# Patient Record
(demographics unavailable — no encounter records)

---

## 2025-01-27 NOTE — PHYSICAL EXAM
[Normal Appearance] : normal appearance [Well Groomed] : well groomed [General Appearance - In No Acute Distress] : no acute distress [] : no rash [Oriented To Time, Place, And Person] : oriented to person, place, and time [Affect] : the affect was normal [Mood] : the mood was normal [de-identified] : Prior 3.25# ring with support removed and replaced, healthy appearing tissues  [Chaperone Present] : A chaperone was present in the examining room during all aspects of the physical examination [[id number]] : A chaperone was present during the pelvic exam. [FreeTextEntry2] : Ada GRIFFIN

## 2025-01-27 NOTE — ASSESSMENT
[FreeTextEntry1] : 74-year-old female who presents for pelvic organ prolapse  Patient is had no issues with the pessary.  This was removed and replaced without issue.  Discussed vaginal estrogen cream and that there has been no association with breast cancer.  We also discussed the role of coconut oil if she is more comfortable with this.   Return to clinic in 3 months

## 2025-01-27 NOTE — REASON FOR VISIT
[TextEntry] : 74-year-old female who presents for pelvic organ prolapse  Patient's been dealing with prolapse for the past 6 years.  It was mostly nonbothersome until to 3 months ago.  She has the sensation that she sitting on a ball and is splinting for bladder emptying.  She also feels like when she is on her feet for a while, her stream is a dribble.  She is nocturia x 2.  She denies any prior urologic history.  She denies stress urinary incontinence.  She is a history of 2 vaginal deliveries 1 of which was breech.  She had a history of a D&C for hyperplasia 15 years ago as well as fibroid removal 7 years ago for cramping.  She is not sexually active.  She has IBS that is diet controlled She denies diabetes  PVR 49 cc Creatinine 1.6, GFR 33 A1c 5.5% Genitourinary: the meatus of the urethra showed no abnormalities . +vaginal atrophy, TVL 9 C +6 Aa +3 Ap +6 Ba +3 Bp +4 ; fitted with 2.75" ring pessary.    Today on 9/10 patient reports that the pessary dislodge 3 hours after she was seen.  She is interested in another fitting. Genitourinary:. Fitted with 3" pessary ring, dislodged; refitted with 3.25" ring with support.  Today on 9/23 patient reports that the pessary is very comfortable and is work well.  It is stayed in place.  She has no complaints.  She is voiding without difficulty.  Today on 1/27 patient reports that overall the pessary has worked well.  She has had no issues.  She denies any vaginal bleeding or discomfort.  She finds it to be very comfortable.  She has not been using estrogen cream due to concerns about breast cancer risk.  Her mother had breast cancer.

## 2025-02-08 NOTE — REVIEW OF SYSTEMS
[Anxiety] : anxiety [Fever] : no fever [Earache] : no earache [Chest Pain] : no chest pain [Palpitations] : no palpitations [Shortness Of Breath] : no shortness of breath [Abdominal Pain] : no abdominal pain [Joint Pain] : no joint pain

## 2025-02-08 NOTE — HEALTH RISK ASSESSMENT
[Yes] : Yes [Monthly or less (1 pt)] : Monthly or less (1 point) [1 or 2 (0 pts)] : 1 or 2 (0 points) [Never (0 pts)] : Never (0 points) [No] : In the past 12 months have you used drugs other than those required for medical reasons? No [No falls in past year] : Patient reported no falls in the past year [0] : 2) Feeling down, depressed, or hopeless: Not at all (0) [Never] : Never [Audit-CScore] : 1 [de-identified] : minimal [de-identified] : healthy [[id number]] : 0

## 2025-02-08 NOTE — HEALTH RISK ASSESSMENT
[Yes] : Yes [Monthly or less (1 pt)] : Monthly or less (1 point) [1 or 2 (0 pts)] : 1 or 2 (0 points) [Never (0 pts)] : Never (0 points) [No] : In the past 12 months have you used drugs other than those required for medical reasons? No [No falls in past year] : Patient reported no falls in the past year [0] : 2) Feeling down, depressed, or hopeless: Not at all (0) [Never] : Never [Audit-CScore] : 1 [de-identified] : minimal [de-identified] : healthy [[id number]] : 0

## 2025-02-08 NOTE — PHYSICAL EXAM
[No Acute Distress] : no acute distress [No JVD] : no jugular venous distention [Clear to Auscultation] : lungs were clear to auscultation bilaterally [Regular Rhythm] : with a regular rhythm [No Edema] : there was no peripheral edema [Soft] : abdomen soft [Non Tender] : non-tender [Non-distended] : non-distended [No CVA Tenderness] : no CVA  tenderness [Alert and Oriented x3] : oriented to person, place, and time

## 2025-02-08 NOTE — PLAN
[FreeTextEntry1] : blood sent for routine labs no interval change in status continue current management otherwise

## 2025-02-08 NOTE — HISTORY OF PRESENT ILLNESS
[FreeTextEntry1] : for f/u on BP  [de-identified] : notes occasional indigestion but feels otherwise well requesting routine labs

## 2025-02-08 NOTE — HISTORY OF PRESENT ILLNESS
[FreeTextEntry1] : for f/u on BP  [de-identified] : notes occasional indigestion but feels otherwise well requesting routine labs

## 2025-03-27 NOTE — HISTORY OF PRESENT ILLNESS
[FreeTextEntry1] :  notes isolated premature beats  no associated symptoms has been recurrent issue currently wearing recorder which will br read  by cardiology [de-identified] : followed regularly by cardiology for same issue[Dr Peña] no CP or dyspnea

## 2025-03-27 NOTE — HEALTH RISK ASSESSMENT
[No] : In the past 12 months have you used drugs other than those required for medical reasons? No [No falls in past year] : Patient reported no falls in the past year [0] : 2) Feeling down, depressed, or hopeless: Not at all (0) [Never] : Never [Audit-CScore] : 0 [de-identified] : walking [de-identified] : trying [[id number]] : 0

## 2025-03-27 NOTE — HEALTH RISK ASSESSMENT
[No] : In the past 12 months have you used drugs other than those required for medical reasons? No [No falls in past year] : Patient reported no falls in the past year [0] : 2) Feeling down, depressed, or hopeless: Not at all (0) [Never] : Never [Audit-CScore] : 0 [de-identified] : walking [de-identified] : trying [[id number]] : 0

## 2025-03-27 NOTE — HISTORY OF PRESENT ILLNESS
[FreeTextEntry1] :  notes isolated premature beats  no associated symptoms has been recurrent issue currently wearing recorder which will br read  by cardiology [de-identified] : followed regularly by cardiology for same issue[Dr Peña] no CP or dyspnea

## 2025-03-27 NOTE — PLAN
[FreeTextEntry1] : currently wearing recording device to be reviewed by cardiology no acute problem identified blood sent for routine labs

## 2025-03-27 NOTE — HEALTH RISK ASSESSMENT
[No] : In the past 12 months have you used drugs other than those required for medical reasons? No [No falls in past year] : Patient reported no falls in the past year [0] : 2) Feeling down, depressed, or hopeless: Not at all (0) [Never] : Never [Audit-CScore] : 0 [de-identified] : walking [de-identified] : trying [[id number]] : 0

## 2025-04-30 NOTE — ASSESSMENT
[FreeTextEntry1] : 75-year-old female with pelvic organ prolapse   Pessary exchanged today.  Follow-up in 3 or 4 months for next exchange, advised that she would see my partner if she returns in 3 months

## 2025-04-30 NOTE — REASON FOR VISIT
[TextEntry] : 74-year-old female who presents for pelvic organ prolapse  Patient's been dealing with prolapse for the past 6 years.  It was mostly nonbothersome until to 3 months ago.  She has the sensation that she sitting on a ball and is splinting for bladder emptying.  She also feels like when she is on her feet for a while, her stream is a dribble.  She is nocturia x 2.  She denies any prior urologic history.  She denies stress urinary incontinence.  She is a history of 2 vaginal deliveries 1 of which was breech.  She had a history of a D&C for hyperplasia 15 years ago as well as fibroid removal 7 years ago for cramping.  She is not sexually active.  She has IBS that is diet controlled She denies diabetes  PVR 49 cc Creatinine 1.6, GFR 33 A1c 5.5% Genitourinary: the meatus of the urethra showed no abnormalities . +vaginal atrophy, TVL 9 C +6 Aa +3 Ap +6 Ba +3 Bp +4 ; fitted with 2.75" ring pessary.    Today on 9/10 patient reports that the pessary dislodge 3 hours after she was seen.  She is interested in another fitting. Genitourinary:. Fitted with 3" pessary ring, dislodged; refitted with 3.25" ring with support.  Today on 9/23 patient reports that the pessary is very comfortable and is work well.  It is stayed in place.  She has no complaints.  She is voiding without difficulty.  Today on 1/27 patient reports that overall the pessary has worked well.  She has had no issues.  She denies any vaginal bleeding or discomfort.  She finds it to be very comfortable.  She has not been using estrogen cream due to concerns about breast cancer risk.  Her mother had breast cancer. Genitourinary:. Prior 3.25# ring with support removed and replaced, healthy appearing tissues.  Today on 4/30 patient reports no interval issues.  She has been using estrogen cream.  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  She went for ultrasound and this was negative.

## 2025-04-30 NOTE — PHYSICAL EXAM
[Normal Appearance] : normal appearance [Well Groomed] : well groomed [General Appearance - In No Acute Distress] : no acute distress [] : no rash [Oriented To Time, Place, And Person] : oriented to person, place, and time [Affect] : the affect was normal [Mood] : the mood was normal [de-identified] : Prior 3.25# ring with support removed and replaced, healthy appearing tissues  [Chaperoned Physical Exam] : A chaperone was present in the examining room during all aspects of the physical examination. [FreeTextEntry2] : Ada GRIFFIN

## 2025-06-09 NOTE — DISCUSSION/SUMMARY
[Patient] : the patient [FreeTextEntry1] : Dr. Peña-(PRIOR VISIT and PMH WITH Dr. Peña): This is a 73-year-old female clusters of palpitations, hypertension, hyperlipidemia, who comes in for followup cardiac evaluation. She denies chest pain, shortness of breath, dizziness or syncope. The patient had a normal exercise stress test February 12, 2024. Echo Doppler examination done October 10, 2023 demonstrated normal left ventricular ejection fraction of 63% with a range of 60 to 65%, mild mitral and mild tricuspid valve regurgitation and minimal pulmonic valve regurgitation. The patient reports she had recent blood work done by her nephrologist. She will get me a copy of those results for my review. Lipid profile done March 13, 2023 demonstrated cholesterol 163, HDL of 47, triglycerides 110, LDL direct 99, non-HDL cholesterol 160 mg/dL with hemoglobin A1c of 5.6.  Her GFR is 34 cc/min and creatinine 1.60 with a BUN of 32 She is currently hemodynamically stable and asymptomatic from cardiac standpoint.  She will continue on her current dose of Zocor 20 mg, Benicar hydrochlorothiazide 20/12.5 mg/day and metoprolol tartrate 25 mg twice per day  Electrocardiogram done March 3, 2022 demonstrated sinus bradycardia rate of 56 beats per minute is otherwise unremarkable. The blood pressure is under good control. She will followup with her nephrologist. Electrocardiogram done July 13, 2021 demonstrates sinus bradycardia rate of 52 bpm is otherwise remarkable for nonspecific ST wave changes. Echo Doppler examination done July 13, 2021 demonstrated moderate mitral valve regurgitation, mild tricuspid valve regurgitation, mild pulmonic valve regurgitation, mild left atrial enlargement and satisfactory left ventricular function with an estimated ejection fraction of 66%. She had a normal exercise stress test September 11, 2020.   PMH: The patient had a infection the end of February 2020.  She felt this was a viral syndrome but was "quite sick".  She developed a clot in her right lower extremity February 26, 2020.  This was confirmed at [facility] with venous Dopplers demonstrating an intraluminal thrombus in the right popliteal vein and tibial trunk veins.  She was put on Eliquis 5 mg twice per day and followed up by Dr. Hernandez in a vascular surgery.  The patient reports a subsequent venous Doppler was done approximately 3 months later and the clot had disappeared.  She was taken off Eliquis at that time  She had a Holter monitor done April 12, 2019 which demonstrated normal sinus rhythm without significant ectopy or heart block.    The patient understands that aerobic exercises must be increased to 40 minutes 4 times per week. A detailed discussion of lifestyle modification was done today. The patient has a good understanding of the diagnosis, and treatment plan. Lifestyle modification was also outlined.

## 2025-06-09 NOTE — PHYSICAL EXAM
[Well Developed] : well developed [Well Nourished] : well nourished [No Acute Distress] : no acute distress [Normal Venous Pressure] : normal venous pressure [No Carotid Bruit] : no carotid bruit [Normal S1, S2] : normal S1, S2 [No Rub] : no rub [No Pitting Edema] : no pitting edema present [Clear Lung Fields] : clear lung fields [Good Air Entry] : good air entry [No Respiratory Distress] : no respiratory distress  [Soft] : abdomen soft [Non Tender] : non-tender [No Masses/organomegaly] : no masses/organomegaly [Normal Gait] : normal gait [Normal Bowel Sounds] : normal bowel sounds [No Edema] : no edema [No Cyanosis] : no cyanosis [No Clubbing] : no clubbing [No Varicosities] : no varicosities [No Rash] : no rash [No Skin Lesions] : no skin lesions [Moves all extremities] : moves all extremities [Normal Speech] : normal speech [No Focal Deficits] : no focal deficits [Alert and Oriented] : alert and oriented [Normal memory] : normal memory [General Appearance - Well Developed] : well developed [Normal Appearance] : normal appearance [Well Groomed] : well groomed [General Appearance - Well Nourished] : well nourished [No Deformities] : no deformities [General Appearance - In No Acute Distress] : no acute distress [Normal Conjunctiva] : the conjunctiva exhibited no abnormalities [Normal Oral Mucosa] : normal oral mucosa [Normal Oropharynx] : normal oropharynx [Normal Jugular Venous A Waves Present] : normal jugular venous A waves present [Normal Jugular Venous V Waves Present] : normal jugular venous V waves present [No Jugular Venous Spring A Waves] : no jugular venous spring A waves [Respiration, Rhythm And Depth] : normal respiratory rhythm and effort [Exaggerated Use Of Accessory Muscles For Inspiration] : no accessory muscle use [Auscultation Breath Sounds / Voice Sounds] : lungs were clear to auscultation bilaterally [Bowel Sounds] : normal bowel sounds [Abnormal Walk] : normal gait [Abdomen Soft] : soft [Nail Clubbing] : no clubbing of the fingernails [Cyanosis, Localized] : no localized cyanosis [Skin Color & Pigmentation] : normal skin color and pigmentation [] : no rash [Skin Lesions] : no skin lesions [No Venous Stasis] : no venous stasis [No Skin Ulcers] : no skin ulcer [No Xanthoma] : no  xanthoma was observed [Oriented To Time, Place, And Person] : oriented to person, place, and time [Impaired Insight] : insight and judgment were intact [5th Left ICS - MCL] : palpated at the 5th LICS in the midclavicular line [Normal] : normal [No Precordial Heave] : no precordial heave was noted [Normal Rate] : normal [Rhythm Regular] : regular [Normal S1] : normal S1 [Normal S2] : normal S2 [No Gallop] : no gallop heard [I] : a grade 1 [Right Carotid Bruit] : right carotid bruit heard [2+] : right 2+ [___ +] : bilateral [unfilled]U+ pitting edema to the ankles [S3] : no S3 [S4] : no S4 [Click] : no click [Pericardial Rub] : no pericardial rub

## 2025-06-09 NOTE — ASSESSMENT
[FreeTextEntry1] : This is a 74-year-old female here today for follow-up cardiac evaluation.   She has a past medical history significant for palpitations, hypertension, hyperlipidemia and CKD.  HPI: She is feeling generally well today and denies chest pain, dizziness, heart palpitations, recent episodes of syncope or falls, SOB, or dyspnea at this time.  Current Medications: Metoprolol tartrate 25 mg BID, Olmesartan-Hydrochlorothiazide 20-12.5 mg daily and Simvastatin 20 mg daily.   She was seen by Dr. Segovia in May 2024 who recommended continued continuing antiarrhythmic medication as ablation who have a low yield of success with a low burden seen on her Zio.   She follows up routinely with her nephrologist Dr. Sarmiento.   BLOOD PRESSURE: -BP is well controlled in today's visit.    BLOOD WORK: -Lipid panel done February 2025 demonstrated triglycerides 111, cholesterol 165, HDL 52, LDL 93, non-. -Lipid panel done June 2024 demonstrated triglycerides 154, cholesterol 167, HDL 51, LDL 83, non-, LDL direct 89. -Lipid panel done March 2024 demonstrated cholesterol 156, triglycerides 117, HDL 55 LDL 80, non-, LDL direct 80, Potassium 4.0 mmol/L -Lipid profile done March 13, 2023 demonstrated cholesterol 163, HDL of 47, triglycerides 110, LDL direct 99, non-HDL cholesterol 160 mg/dL with hemoglobin A1c of 5.6.  Her GFR is 34 cc/min and creatinine 1.60 with a BUN of 32     TESTING/REPORTS: -EKG done March 2025 demonstrated normal sinus rhythm rate 65 bpm otherwise unremarkable.  -Zio extended Holter monitor done March 2025 demonstrated predominant underlying rhythm: Sinus rhythm with average heart rate 89 bpm.  Episode of SVT, 8 beats with average heart rate of 102 bpm, maximum heart rate 175 bpm.  No significant symptoms, no heart block.  -Echocardiogram done March 2025 demonstrated normal left ventricular systolic function EF 65%, mild to moderate mitral regurgitation, mild tricuspid regurgitation, mild pulmonic regurgitation.  -EKG done 06/25/2024 demonstrated sinus bradycardia rate 49 BPM otherwise unremarkable.  -EKG done 03/22/2024 demonstrated sinus bradycardia rate 51 BPM otherwise unremarkable.   -Zio-Holter monitor done March 2024 demonstrated overall average HR 56 BPM with predominant underlying rhythm of normal sinus. No significant arrythmia or heart block present.  -The patient had a normal exercise stress test February 12, 2024.  -Echo Doppler examination done October 10, 2023 demonstrated normal left ventricular ejection fraction of 63% with a range of 60 to 65%, mild mitral and mild tricuspid valve regurgitation and minimal pulmonic valve regurgitation.  -EKG done 03/13/2023 which demonstrated regular sinus rhythm with nonspecific ST-T wave changes BPM of 53.  -Electrocardiogram done March 3, 2022 demonstrated sinus bradycardia rate of 56 beats per minute is otherwise unremarkable.  -Carotid Doppler done July 29, 2021 demonstrated normal study  -Electrocardiogram done July 13, 2021 demonstrates sinus bradycardia rate of 52 bpm is otherwise remarkable for nonspecific ST wave changes.  -Echo Doppler examination done July 13, 2021 demonstrated moderate mitral valve regurgitation, mild tricuspid valve regurgitation, mild pulmonic valve regurgitation, mild left atrial enlargement and satisfactory left ventricular function with an estimated ejection fraction of 66%.  -EKG done Feb 12, 2021 which demonstrated regular sinus rhythm with nonspecific ST-T wave changes, BPM of 60.  -The patient had an echocardiogram done on 10/23/2019 which demonstrated mild mitral, tricuspid and pulmonic regurgitation with normal left ventricular systolic function. EF on echo reported to be 65%.  -The patient had a normal exercise stress test done on 9/11/2020.  -The patient had a normal 24 hour holter Monitor placed on 2/12/2021 that reveals sinus rhythm with an average HR of 65 BPM.   -She had a Holter monitor done April 12, 2019 which demonstrated normal sinus rhythm without significant ectopy or heart block.    -PMH: The patient had an infection the end of February 2020.  She felt this was a viral syndrome but was "quite sick".  She developed a clot in her right lower extremity February 26, 2020.  This was confirmed at [facility] with venous Dopplers demonstrating an intraluminal thrombus in the right popliteal vein and tibial trunk veins.  She was put on Eliquis 5 mg twice per day and followed up by Dr. Hernandez in a vascular surgery.  The patient reports a subsequent venous Doppler was done approximately 3 months later and the clot had disappeared.  She was taken off Eliquis at that time   PLAN: -She will continue with her current medications and will contact the office if she is having any complaints between now and their next follow up appointment. -She will follow-up with her PCP routinely.   I have discussed the plan of care with DINA WASHINGTON and she will follow up in 4-6 months. They are compliant with all of their medications.     The patient understands that aerobic exercises must be increased to 40 minutes 4 times/week and a detailed discussion of lifestyle modification was done today.   The patient has a good understanding of the diagnosis, treatment plan and lifestyle modification.   They will contact me at the office for any questions with their care or any changes in their health status.   The patient was discussed with supervision physician Dr. Phil Peña at the time of the visit and the plan of care will be carried out as noted above.     ISAÍAS Joel NP

## 2025-06-09 NOTE — REASON FOR VISIT
[CV Risk Factors and Non-Cardiac Disease] : CV risk factors and non-cardiac disease [Follow-Up - Clinic] : a clinic follow-up of [Hyperlipidemia] : hyperlipidemia [Hypertension] : hypertension [Palpitations] : palpitations [FreeTextEntry3] : Dr. Polanco  [FreeTextEntry1] : murmur, renal insufficiency

## 2025-07-30 NOTE — ASSESSMENT
[FreeTextEntry1] : 75  y.o G2 P 2  2-woman former pt of Dr Foss here today for pessary change , last fitted in 2025. She noted brownish staining on her poise pad for the past 4 days , with no other accompanying constitutional ss .Fluids - suboptimal , no thirst  Voids 2-3 times DTF and nocturia x 2 with UUI requiring 1 poise pad per day. She has not seen a gyn in a few years.  PVR 18 ml    Ring Pessary with support # 6 removed slightly brown stains, tolerated, no blood noted in the vaginal vault, and inserted new Ring pessary with support # 6 tolerated, and pt tolerated it well RTO in 3 months with Dr Foss

## 2025-07-30 NOTE — HISTORY OF PRESENT ILLNESS
[FreeTextEntry1] : 75  y.o G2 P 2  2-woman former pt of Dr Foss here today for pessary change , last fitted in 2025. She noted brownish staining on her poise pad for the past 4 days , with no other accompanying constitutional ss .Fluids - suboptimal , no thirst  Voids 2-3 times DTF and nocturia x 2 with UUI requiring 1 poise pad per day. She has not seen a gyn in a few years.  PVR 18 ml   Pessary # 6 removed slightly brown stains, tolerated, no blood noted in the vaginal vault, and inserted new Ring pessary # 6 tolerated, and pt tolerated it well RTO in 3 months with Dr Foss

## 2025-07-30 NOTE — PHYSICAL EXAM
[Normal Appearance] : normal appearance [Well Groomed] : well groomed [General Appearance - In No Acute Distress] : no acute distress [Edema] : no peripheral edema [Respiration, Rhythm And Depth] : normal respiratory rhythm and effort [Exaggerated Use Of Accessory Muscles For Inspiration] : no accessory muscle use [Abdomen Soft] : soft [Abdomen Tenderness] : non-tender [Costovertebral Angle Tenderness] : no ~M costovertebral angle tenderness [Urethral Meatus] : normal urethra [Urinary Bladder Findings] : the bladder was normal on palpation [External Female Genitalia] : normal external genitalia [Normal Station and Gait] : the gait and station were normal for the patient's age [] : no rash [No Focal Deficits] : no focal deficits [Oriented To Time, Place, And Person] : oriented to person, place, and time [Affect] : the affect was normal [Mood] : the mood was normal [de-identified] : Cystocele stage 3 [Chaperoned Physical Exam] : A chaperone was present in the examining room during all aspects of the physical examination. [FreeTextEntry2] : Anjelica Oro NP [FreeTextEntry1] : NP